# Patient Record
(demographics unavailable — no encounter records)

---

## 2025-04-18 NOTE — HISTORY OF PRESENT ILLNESS
[FreeTextEntry1] : 46 y/o M presents for initial evaluation.   Reason for visit: Hemorrhoid Referred by: Dr. Diaz   PMHx: denies  PSHx: varicose vein removal. Medications: PrEP  Allergies: NKDA  Denies family hx of CRC/IBD  Smoker: former smoker of 10 years. quit in 2006   3/24/25 Colonoscopy: Last done with Dr. Diaz at [facility]. Reports one polyp was removed and was told to follow up in 7 years.    Patient presents today with concerns of hemorrhoidal symptoms.  Long hx of hemorrhoidal symptoms since 2020. s/p RBL x 2 in 2023 at [place] but reports did not repond to treatment.  Was seen by GI Dr. Diaz at the end of March for his hemorrhoids for IRC. Reports was told he is not a candidate for IRC.   Currently patient will see bright red blood on toilet paper and toilet bowl after most BM.  Will feel continuous rectal discomfort throughout the day.  Will feel tissue protrusion near his anal opening that swells up after a bowel movement. Can be manually reduced.  Has been hydrocortisone cream with no relief.   Bowel movement is usually once a day. Stool is soft and formed. Denies constipation.   Denies usage of stool softener, fiber supplement.   Denies recent use of NSAIDS/currently not on any anticoagulants.   STI testing was last performed about three days with his PCP.

## 2025-04-18 NOTE — PHYSICAL EXAM
[Excoriation] : no perianal excoriation [Skin Tags] : there were no residual hemorrhoidal skin tags seen [Normal] : was normal [None] : there was no rectal mass  [FreeTextEntry1] : Medical assistant was present for the entire exam.  Anoscopy was performed for evaluation of the patients rectal bleeding  history . The risks, benefits and alternatives were reviewed.  A lighted anoscope was passed into the anal canal and the entire anal mucosal surface was inspected..   The findings revealed moderate/large internal hemorrhoids. No masses or lesions were identified.

## 2025-04-18 NOTE — ASSESSMENT
[FreeTextEntry1] : I reviewed with the patient the treatment options for internal hemorrhoids. At this time I have suggested the patient consider increasing daily fiber intake (fiber guidelines both synthetic and dietary were reviewed), increasing water, and stool softeners as necessary. The role of medical and surgical therapy has been outlined. The risks, benefits and alternatives including post procedure pain, bleeding, infection, and the need for future procedures have been discussed.  Patient will return in 1 to 2 weeks for rubber band ligation.  He is leaving on vacation today and recommend return for rubber band ligation at his convenience.

## 2025-06-06 NOTE — PHYSICAL EXAM
[Excoriation] : no perianal excoriation [Skin Tags] : there were no residual hemorrhoidal skin tags seen [Normal] : was normal [None] : there was no rectal mass  [FreeTextEntry1] : Medical assistant was present for the entire exam.  Anoscopy was performed for evaluation of the patients rectal bleeding  history . The risks, benefits and alternatives were reviewed.  A lighted anoscope was passed into the anal canal and the entire anal mucosal surface was inspected..   The findings revealed moderate/large internal hemorrhoids. No masses or lesions were identified. The risks and benefits of rubber band ligation were discussed with the patient including but not limited to bleeding, pain, infection, and the need for future procedures. The anoscope was placed and rubber band ligation was performed of the internal hemorrhoids-left lateral and right anterior quadrants with good result. The patient tolerated the procedure well. Appropriate postprocedure instructions were given to the patient.

## 2025-06-06 NOTE — HISTORY OF PRESENT ILLNESS
[FreeTextEntry1] : 45 y/o M presents for follow up.   Initially referred by: Dr. Diaz  PMHx: denies PSHx: varicose vein removal. Medications: PrEP Allergies: NKDA Denies family hx of CRC/IBD Smoker: former smoker of 10 years. quit in 2006  3/24/25 Colonoscopy: Last done with Dr. Diaz at [facility]. Reports one polyp was removed and was told to follow up in 7 years.  4/18/25 Initially seen with concerns of rectal bleeding.  Long hx of hemorrhoidal symptoms since 2020. s/p RBL x 2 in 2023 at [place] but reports did not respond to treatment. Anoscope findings revealed moderate/large internal hemorrhoids. Patient will return in 1 to 2 weeks for rubber band ligation. He is leaving on vacation today and recommend return for rubber band ligation at his convenience.  Patient presents today for follow up.  Believes symptoms has not changed. Is still experiencing tissue protrusion with every bowel movement.  He is seeing daily rectal bleeding seen on toilet paper after bowel movement.  Will feel rectal pain with tissue protrusion.   Bowel movement is daily. Denies straining.  Is currently on fiber supplement.   Denies recent use of NSAIDS/not on any anticoagulants.